# Patient Record
Sex: MALE | Race: WHITE | NOT HISPANIC OR LATINO | URBAN - METROPOLITAN AREA
[De-identification: names, ages, dates, MRNs, and addresses within clinical notes are randomized per-mention and may not be internally consistent; named-entity substitution may affect disease eponyms.]

---

## 2019-01-17 NOTE — PATIENT DISCUSSION
CATARACTS, OU - NOT VISUALLY SIGNIFICANT. DISC OPTION OF [id number]-GD-[id number]. GLASSES RX GIVEN TO FILL IF DESIRES. RE-EVALUATE IN 1 YEAR.

## 2020-12-01 NOTE — PATIENT DISCUSSION
- Follow up in 1 year for [facility] SERVICES OF [facility], [facility]

## 2021-10-26 ENCOUNTER — PREPPED CHART (OUTPATIENT)
Dept: URBAN - METROPOLITAN AREA CLINIC 14 | Facility: CLINIC | Age: 61
End: 2021-10-26

## 2021-11-01 ENCOUNTER — CONSULT (OUTPATIENT)
Dept: URBAN - METROPOLITAN AREA CLINIC 14 | Facility: CLINIC | Age: 61
End: 2021-11-01

## 2021-11-01 DIAGNOSIS — H43.812: ICD-10-CM

## 2021-11-01 DIAGNOSIS — H33.312: ICD-10-CM

## 2021-11-01 DIAGNOSIS — H25.13: ICD-10-CM

## 2021-11-01 PROCEDURE — 67145 PROPH RTA DTCHMNT PC: CPT

## 2021-11-01 PROCEDURE — 92134 CPTRZ OPH DX IMG PST SGM RTA: CPT

## 2021-11-01 PROCEDURE — 92014 COMPRE OPH EXAM EST PT 1/>: CPT

## 2021-11-01 ASSESSMENT — TONOMETRY
OS_IOP_MMHG: 19
OD_IOP_MMHG: 15

## 2021-11-01 ASSESSMENT — VISUAL ACUITY
OS_SC: 20/20-1
OD_PH: 20/40
OD_SC: 20/60+1

## 2021-11-29 ENCOUNTER — POST-OP (OUTPATIENT)
Dept: URBAN - METROPOLITAN AREA CLINIC 14 | Facility: CLINIC | Age: 61
End: 2021-11-29

## 2021-11-29 DIAGNOSIS — H33.312: ICD-10-CM

## 2021-11-29 PROCEDURE — 67145 PROPH RTA DTCHMNT PC: CPT

## 2021-11-29 ASSESSMENT — TONOMETRY
OS_IOP_MMHG: 18
OD_IOP_MMHG: 19

## 2021-11-29 ASSESSMENT — VISUAL ACUITY
OS_SC: 20/20-2
OD_SC: 20/80+1

## 2021-12-13 ENCOUNTER — POST-OP (OUTPATIENT)
Dept: URBAN - METROPOLITAN AREA CLINIC 14 | Facility: CLINIC | Age: 61
End: 2021-12-13

## 2021-12-13 DIAGNOSIS — H33.312: ICD-10-CM

## 2021-12-13 PROCEDURE — 99024 POSTOP FOLLOW-UP VISIT: CPT

## 2021-12-13 ASSESSMENT — TONOMETRY
OS_IOP_MMHG: 18
OD_IOP_MMHG: 18

## 2021-12-13 ASSESSMENT — VISUAL ACUITY
OD_SC: 20/200
OS_SC: 20/20-2

## 2022-01-31 ENCOUNTER — POST-OP (OUTPATIENT)
Dept: URBAN - METROPOLITAN AREA CLINIC 14 | Facility: CLINIC | Age: 62
End: 2022-01-31

## 2022-01-31 DIAGNOSIS — H33.312: ICD-10-CM

## 2022-01-31 PROCEDURE — 99024 POSTOP FOLLOW-UP VISIT: CPT

## 2022-01-31 ASSESSMENT — TONOMETRY
OS_IOP_MMHG: 16
OD_IOP_MMHG: 16

## 2022-01-31 ASSESSMENT — VISUAL ACUITY
OS_SC: 20/25
OD_SC: 20/100

## 2022-02-18 ENCOUNTER — ESTABLISHED PATIENT (OUTPATIENT)
Dept: URBAN - METROPOLITAN AREA CLINIC 14 | Facility: CLINIC | Age: 62
End: 2022-02-18

## 2022-02-18 DIAGNOSIS — H25.13: ICD-10-CM

## 2022-02-18 PROCEDURE — 99199ADVT ADVANCED VISION TESTING

## 2022-02-18 PROCEDURE — 99214 OFFICE O/P EST MOD 30 MIN: CPT

## 2022-02-18 ASSESSMENT — VISUAL ACUITY
OD_SC: 20/200
OS_SC: 20/30
OD_BCVA: 20/40
OS_BCVA: 20/25
OD_CC: 20/200
OS_CC: 20/25

## 2022-02-18 ASSESSMENT — KERATOMETRY
OD_AXISANGLE2_DEGREES: 67
OS_AXISANGLE2_DEGREES: 97
OS_AXISANGLE_DEGREES: 7
OS_K2POWER_DIOPTERS: 42.50
OD_AXISANGLE_DEGREES: 157
OD_K1POWER_DIOPTERS: 41.00
OS_K1POWER_DIOPTERS: 41.00
OD_K2POWER_DIOPTERS: 42.25

## 2022-02-18 ASSESSMENT — TONOMETRY
OD_IOP_MMHG: 17
OS_IOP_MMHG: 18

## 2022-03-03 ENCOUNTER — POST-OP (OUTPATIENT)
Dept: URBAN - METROPOLITAN AREA CLINIC 14 | Facility: CLINIC | Age: 62
End: 2022-03-03

## 2022-03-03 DIAGNOSIS — Z96.1: ICD-10-CM

## 2022-03-03 DIAGNOSIS — H25.12: ICD-10-CM

## 2022-03-03 PROCEDURE — 99024 POSTOP FOLLOW-UP VISIT: CPT

## 2022-03-03 PROCEDURE — 92136 OPHTHALMIC BIOMETRY: CPT

## 2022-03-03 ASSESSMENT — KERATOMETRY
OD_AXISANGLE_DEGREES: 157
OD_AXISANGLE2_DEGREES: 67
OS_AXISANGLE2_DEGREES: 97
OD_K1POWER_DIOPTERS: 41.00
OD_K2POWER_DIOPTERS: 42.25
OS_K1POWER_DIOPTERS: 41.00
OS_K2POWER_DIOPTERS: 42.50
OS_AXISANGLE_DEGREES: 7

## 2022-03-03 ASSESSMENT — VISUAL ACUITY
OD_BCVA: 20/20
OS_BCVA: 20/25
OD_SC: J2
OD_SC: 20/60
OD_PH: 20/40

## 2022-03-03 ASSESSMENT — TONOMETRY
OD_IOP_MMHG: 25
OS_IOP_MMHG: 14

## 2022-04-14 ENCOUNTER — POST-OP (OUTPATIENT)
Dept: URBAN - METROPOLITAN AREA CLINIC 14 | Facility: CLINIC | Age: 62
End: 2022-04-14

## 2022-04-14 DIAGNOSIS — Z96.1: ICD-10-CM

## 2022-04-14 DIAGNOSIS — Z98.890: ICD-10-CM

## 2022-04-14 PROCEDURE — 99024 POSTOP FOLLOW-UP VISIT: CPT

## 2022-04-14 ASSESSMENT — TONOMETRY
OD_IOP_MMHG: 11
OS_IOP_MMHG: 23

## 2022-04-14 ASSESSMENT — VISUAL ACUITY
OS_SC: J2
OS_SC: 20/30
OD_SC: J1
OD_SC: 20/50

## 2022-04-14 ASSESSMENT — KERATOMETRY
OD_AXISANGLE2_DEGREES: 67
OD_AXISANGLE_DEGREES: 157
OS_AXISANGLE2_DEGREES: 97
OD_K1POWER_DIOPTERS: 41.00
OS_K1POWER_DIOPTERS: 41.00
OS_AXISANGLE_DEGREES: 7
OD_K2POWER_DIOPTERS: 42.25
OS_K2POWER_DIOPTERS: 42.50

## 2022-05-12 ENCOUNTER — POST-OP (OUTPATIENT)
Dept: URBAN - METROPOLITAN AREA CLINIC 14 | Facility: CLINIC | Age: 62
End: 2022-05-12

## 2022-05-12 DIAGNOSIS — Z96.1: ICD-10-CM

## 2022-05-12 PROCEDURE — 99024 POSTOP FOLLOW-UP VISIT: CPT

## 2022-05-12 ASSESSMENT — KERATOMETRY
OD_K1POWER_DIOPTERS: 41.00
OD_AXISANGLE_DEGREES: 159
OS_K1POWER_DIOPTERS: 41.00
OS_AXISANGLE2_DEGREES: 103
OS_K2POWER_DIOPTERS: 42.00
OD_AXISANGLE2_DEGREES: 69
OD_K2POWER_DIOPTERS: 42.25
OS_AXISANGLE_DEGREES: 13

## 2022-05-12 ASSESSMENT — TONOMETRY
OS_IOP_MMHG: 12
OD_IOP_MMHG: 10

## 2022-05-12 ASSESSMENT — VISUAL ACUITY
OD_SC: 20/40
OD_BCVA: 20/30
OS_SC: 20/25
OS_BCVA: 20/25

## 2022-08-09 ENCOUNTER — POST-OP (OUTPATIENT)
Dept: URBAN - METROPOLITAN AREA CLINIC 14 | Facility: CLINIC | Age: 62
End: 2022-08-09

## 2022-08-09 DIAGNOSIS — Z96.1: ICD-10-CM

## 2022-08-09 PROCEDURE — 99024 POSTOP FOLLOW-UP VISIT: CPT

## 2022-08-09 ASSESSMENT — KERATOMETRY
OD_K1POWER_DIOPTERS: 41.00
OS_AXISANGLE_DEGREES: 13
OS_K1POWER_DIOPTERS: 41.00
OD_AXISANGLE_DEGREES: 159
OS_AXISANGLE2_DEGREES: 103
OD_K2POWER_DIOPTERS: 42.25
OS_K2POWER_DIOPTERS: 42.00
OD_AXISANGLE2_DEGREES: 69

## 2022-08-09 ASSESSMENT — TONOMETRY
OS_IOP_MMHG: 16
OD_IOP_MMHG: 15

## 2022-08-09 ASSESSMENT — VISUAL ACUITY
OD_SC: J1
OS_SC: 20/20
OS_SC: J1
OD_SC: 20/25

## 2025-01-08 ENCOUNTER — COMPREHENSIVE EXAM (OUTPATIENT)
Age: 65
End: 2025-01-08

## 2025-01-08 DIAGNOSIS — H04.123: ICD-10-CM

## 2025-01-08 DIAGNOSIS — H43.393: ICD-10-CM

## 2025-01-08 PROCEDURE — 92014 COMPRE OPH EXAM EST PT 1/>: CPT
